# Patient Record
Sex: FEMALE | Race: WHITE
[De-identification: names, ages, dates, MRNs, and addresses within clinical notes are randomized per-mention and may not be internally consistent; named-entity substitution may affect disease eponyms.]

---

## 2017-10-29 ENCOUNTER — HEALTH MAINTENANCE LETTER (OUTPATIENT)
Age: 52
End: 2017-10-29

## 2018-02-09 RX ORDER — TRETINOIN 0.25 MG/G
CREAM TOPICAL AT BEDTIME
COMMUNITY

## 2018-02-15 ENCOUNTER — ANESTHESIA EVENT (OUTPATIENT)
Dept: SURGERY | Facility: CLINIC | Age: 53
End: 2018-02-15
Payer: COMMERCIAL

## 2018-02-15 ENCOUNTER — APPOINTMENT (OUTPATIENT)
Dept: GENERAL RADIOLOGY | Facility: CLINIC | Age: 53
End: 2018-02-15
Attending: ORTHOPAEDIC SURGERY
Payer: COMMERCIAL

## 2018-02-15 ENCOUNTER — HOSPITAL ENCOUNTER (OUTPATIENT)
Facility: CLINIC | Age: 53
Discharge: HOME | End: 2018-02-15
Attending: ORTHOPAEDIC SURGERY | Admitting: ORTHOPAEDIC SURGERY
Payer: COMMERCIAL

## 2018-02-15 ENCOUNTER — ANESTHESIA (OUTPATIENT)
Dept: SURGERY | Facility: CLINIC | Age: 53
End: 2018-02-15
Payer: COMMERCIAL

## 2018-02-15 VITALS
TEMPERATURE: 98.1 F | BODY MASS INDEX: 25.04 KG/M2 | RESPIRATION RATE: 14 BRPM | HEIGHT: 68 IN | DIASTOLIC BLOOD PRESSURE: 42 MMHG | OXYGEN SATURATION: 95 % | WEIGHT: 165.2 LBS | SYSTOLIC BLOOD PRESSURE: 115 MMHG

## 2018-02-15 DIAGNOSIS — M21.621 TAILOR'S BUNION OF RIGHT FOOT: Primary | ICD-10-CM

## 2018-02-15 PROCEDURE — 40000171 ZZH STATISTIC PRE-PROCEDURE ASSESSMENT III: Performed by: ORTHOPAEDIC SURGERY

## 2018-02-15 PROCEDURE — 37000009 ZZH ANESTHESIA TECHNICAL FEE, EACH ADDTL 15 MIN: Performed by: ORTHOPAEDIC SURGERY

## 2018-02-15 PROCEDURE — 25000128 H RX IP 250 OP 636: Performed by: ANESTHESIOLOGY

## 2018-02-15 PROCEDURE — 27210794 ZZH OR GENERAL SUPPLY STERILE: Performed by: ORTHOPAEDIC SURGERY

## 2018-02-15 PROCEDURE — 25000128 H RX IP 250 OP 636: Performed by: NURSE ANESTHETIST, CERTIFIED REGISTERED

## 2018-02-15 PROCEDURE — 25000566 ZZH SEVOFLURANE, EA 15 MIN: Performed by: ORTHOPAEDIC SURGERY

## 2018-02-15 PROCEDURE — 71000027 ZZH RECOVERY PHASE 2 EACH 15 MINS: Performed by: ORTHOPAEDIC SURGERY

## 2018-02-15 PROCEDURE — 25000128 H RX IP 250 OP 636: Performed by: ORTHOPAEDIC SURGERY

## 2018-02-15 PROCEDURE — 25000125 ZZHC RX 250: Performed by: ANESTHESIOLOGY

## 2018-02-15 PROCEDURE — C1713 ANCHOR/SCREW BN/BN,TIS/BN: HCPCS | Performed by: ORTHOPAEDIC SURGERY

## 2018-02-15 PROCEDURE — 36000058 ZZH SURGERY LEVEL 3 EA 15 ADDTL MIN: Performed by: ORTHOPAEDIC SURGERY

## 2018-02-15 PROCEDURE — 93010 ELECTROCARDIOGRAM REPORT: CPT | Performed by: INTERNAL MEDICINE

## 2018-02-15 PROCEDURE — 27210995 ZZH RX 272: Performed by: ORTHOPAEDIC SURGERY

## 2018-02-15 PROCEDURE — 25000125 ZZHC RX 250: Performed by: NURSE ANESTHETIST, CERTIFIED REGISTERED

## 2018-02-15 PROCEDURE — 36000060 ZZH SURGERY LEVEL 3 W FLUORO 1ST 30 MIN: Performed by: ORTHOPAEDIC SURGERY

## 2018-02-15 PROCEDURE — 71000012 ZZH RECOVERY PHASE 1 LEVEL 1 FIRST HR: Performed by: ORTHOPAEDIC SURGERY

## 2018-02-15 PROCEDURE — 25000132 ZZH RX MED GY IP 250 OP 250 PS 637: Performed by: ORTHOPAEDIC SURGERY

## 2018-02-15 PROCEDURE — 40000278 XR SURGERY CARM FLUORO LESS THAN 5 MIN: Mod: TC

## 2018-02-15 PROCEDURE — 37000008 ZZH ANESTHESIA TECHNICAL FEE, 1ST 30 MIN: Performed by: ORTHOPAEDIC SURGERY

## 2018-02-15 PROCEDURE — 71000013 ZZH RECOVERY PHASE 1 LEVEL 1 EA ADDTL HR: Performed by: ORTHOPAEDIC SURGERY

## 2018-02-15 PROCEDURE — 27211024 ZZHC OR SUPPLY OTHER OPNP: Performed by: ORTHOPAEDIC SURGERY

## 2018-02-15 DEVICE — IMPLANTABLE DEVICE: Type: IMPLANTABLE DEVICE | Site: ANKLE | Status: FUNCTIONAL

## 2018-02-15 RX ORDER — ROPIVACAINE HYDROCHLORIDE 7.5 MG/ML
INJECTION, SOLUTION EPIDURAL; PERINEURAL PRN
Status: DISCONTINUED | OUTPATIENT
Start: 2018-02-15 | End: 2018-02-15

## 2018-02-15 RX ORDER — PROPOFOL 10 MG/ML
INJECTION, EMULSION INTRAVENOUS PRN
Status: DISCONTINUED | OUTPATIENT
Start: 2018-02-15 | End: 2018-02-15

## 2018-02-15 RX ORDER — CEFAZOLIN SODIUM 2 G/100ML
2 INJECTION, SOLUTION INTRAVENOUS
Status: COMPLETED | OUTPATIENT
Start: 2018-02-15 | End: 2018-02-15

## 2018-02-15 RX ORDER — ACETAMINOPHEN 325 MG/1
650 TABLET ORAL EVERY 4 HOURS PRN
Qty: 100 TABLET | Refills: 0 | Status: SHIPPED | OUTPATIENT
Start: 2018-02-15

## 2018-02-15 RX ORDER — PROMETHAZINE HYDROCHLORIDE 25 MG/ML
25 INJECTION INTRAMUSCULAR; INTRAVENOUS ONCE
Status: COMPLETED | OUTPATIENT
Start: 2018-02-15 | End: 2018-02-15

## 2018-02-15 RX ORDER — EPHEDRINE SULFATE 50 MG/ML
INJECTION, SOLUTION INTRAMUSCULAR; INTRAVENOUS; SUBCUTANEOUS PRN
Status: DISCONTINUED | OUTPATIENT
Start: 2018-02-15 | End: 2018-02-15

## 2018-02-15 RX ORDER — ONDANSETRON 4 MG/1
4 TABLET, ORALLY DISINTEGRATING ORAL EVERY 30 MIN PRN
Status: DISCONTINUED | OUTPATIENT
Start: 2018-02-15 | End: 2018-02-15 | Stop reason: HOSPADM

## 2018-02-15 RX ORDER — DEXAMETHASONE SODIUM PHOSPHATE 4 MG/ML
INJECTION, SOLUTION INTRA-ARTICULAR; INTRALESIONAL; INTRAMUSCULAR; INTRAVENOUS; SOFT TISSUE PRN
Status: DISCONTINUED | OUTPATIENT
Start: 2018-02-15 | End: 2018-02-15

## 2018-02-15 RX ORDER — OXYCODONE HYDROCHLORIDE 5 MG/1
5-15 TABLET ORAL
Qty: 40 TABLET | Refills: 0 | Status: SHIPPED | OUTPATIENT
Start: 2018-02-15

## 2018-02-15 RX ORDER — ONDANSETRON 4 MG/1
4-8 TABLET, ORALLY DISINTEGRATING ORAL EVERY 8 HOURS PRN
Qty: 4 TABLET | Refills: 0 | Status: SHIPPED | OUTPATIENT
Start: 2018-02-15

## 2018-02-15 RX ORDER — FENTANYL CITRATE 50 UG/ML
25-50 INJECTION, SOLUTION INTRAMUSCULAR; INTRAVENOUS
Status: DISCONTINUED | OUTPATIENT
Start: 2018-02-15 | End: 2018-02-15 | Stop reason: HOSPADM

## 2018-02-15 RX ORDER — LIDOCAINE HCL/EPINEPHRINE/PF 2%-1:200K
VIAL (ML) INJECTION PRN
Status: DISCONTINUED | OUTPATIENT
Start: 2018-02-15 | End: 2018-02-15

## 2018-02-15 RX ORDER — MAGNESIUM HYDROXIDE 1200 MG/15ML
LIQUID ORAL PRN
Status: DISCONTINUED | OUTPATIENT
Start: 2018-02-15 | End: 2018-02-15 | Stop reason: HOSPADM

## 2018-02-15 RX ORDER — HYDROMORPHONE HYDROCHLORIDE 1 MG/ML
.3-.5 INJECTION, SOLUTION INTRAMUSCULAR; INTRAVENOUS; SUBCUTANEOUS EVERY 10 MIN PRN
Status: DISCONTINUED | OUTPATIENT
Start: 2018-02-15 | End: 2018-02-15 | Stop reason: HOSPADM

## 2018-02-15 RX ORDER — EPHEDRINE SULFATE 50 MG/ML
25 INJECTION, SOLUTION INTRAVENOUS ONCE
Status: COMPLETED | OUTPATIENT
Start: 2018-02-15 | End: 2018-02-15

## 2018-02-15 RX ORDER — OXYCODONE HYDROCHLORIDE 5 MG/1
5-15 TABLET ORAL
Status: COMPLETED | OUTPATIENT
Start: 2018-02-15 | End: 2018-02-15

## 2018-02-15 RX ORDER — FENTANYL CITRATE 50 UG/ML
INJECTION, SOLUTION INTRAMUSCULAR; INTRAVENOUS PRN
Status: DISCONTINUED | OUTPATIENT
Start: 2018-02-15 | End: 2018-02-15

## 2018-02-15 RX ORDER — SODIUM CHLORIDE, SODIUM LACTATE, POTASSIUM CHLORIDE, CALCIUM CHLORIDE 600; 310; 30; 20 MG/100ML; MG/100ML; MG/100ML; MG/100ML
INJECTION, SOLUTION INTRAVENOUS CONTINUOUS
Status: DISCONTINUED | OUTPATIENT
Start: 2018-02-15 | End: 2018-02-15 | Stop reason: HOSPADM

## 2018-02-15 RX ORDER — CEFAZOLIN SODIUM 1 G/3ML
1 INJECTION, POWDER, FOR SOLUTION INTRAMUSCULAR; INTRAVENOUS SEE ADMIN INSTRUCTIONS
Status: DISCONTINUED | OUTPATIENT
Start: 2018-02-15 | End: 2018-02-15 | Stop reason: HOSPADM

## 2018-02-15 RX ORDER — LIDOCAINE 40 MG/G
CREAM TOPICAL
Status: DISCONTINUED | OUTPATIENT
Start: 2018-02-15 | End: 2018-02-15 | Stop reason: HOSPADM

## 2018-02-15 RX ORDER — MEPERIDINE HYDROCHLORIDE 25 MG/ML
12.5 INJECTION INTRAMUSCULAR; INTRAVENOUS; SUBCUTANEOUS
Status: DISCONTINUED | OUTPATIENT
Start: 2018-02-15 | End: 2018-02-15 | Stop reason: HOSPADM

## 2018-02-15 RX ORDER — NALOXONE HYDROCHLORIDE 0.4 MG/ML
.1-.4 INJECTION, SOLUTION INTRAMUSCULAR; INTRAVENOUS; SUBCUTANEOUS
Status: DISCONTINUED | OUTPATIENT
Start: 2018-02-15 | End: 2018-02-15 | Stop reason: HOSPADM

## 2018-02-15 RX ORDER — ONDANSETRON 2 MG/ML
4 INJECTION INTRAMUSCULAR; INTRAVENOUS EVERY 30 MIN PRN
Status: DISCONTINUED | OUTPATIENT
Start: 2018-02-15 | End: 2018-02-15 | Stop reason: HOSPADM

## 2018-02-15 RX ORDER — KETOROLAC TROMETHAMINE 10 MG/1
10 TABLET, FILM COATED ORAL EVERY 6 HOURS PRN
Qty: 20 TABLET | Refills: 0 | Status: SHIPPED | OUTPATIENT
Start: 2018-02-15

## 2018-02-15 RX ORDER — LIDOCAINE HYDROCHLORIDE 10 MG/ML
INJECTION, SOLUTION INFILTRATION; PERINEURAL PRN
Status: DISCONTINUED | OUTPATIENT
Start: 2018-02-15 | End: 2018-02-15

## 2018-02-15 RX ORDER — ONDANSETRON 2 MG/ML
INJECTION INTRAMUSCULAR; INTRAVENOUS PRN
Status: DISCONTINUED | OUTPATIENT
Start: 2018-02-15 | End: 2018-02-15

## 2018-02-15 RX ORDER — MORPHINE SULFATE 15 MG/1
15 TABLET, FILM COATED, EXTENDED RELEASE ORAL EVERY 12 HOURS
Qty: 6 TABLET | Refills: 0 | Status: SHIPPED | OUTPATIENT
Start: 2018-02-15

## 2018-02-15 RX ADMIN — DEXAMETHASONE SODIUM PHOSPHATE 4 MG: 4 INJECTION, SOLUTION INTRA-ARTICULAR; INTRALESIONAL; INTRAMUSCULAR; INTRAVENOUS; SOFT TISSUE at 07:46

## 2018-02-15 RX ADMIN — OXYCODONE HYDROCHLORIDE 5 MG: 5 TABLET ORAL at 10:42

## 2018-02-15 RX ADMIN — ONDANSETRON 4 MG: 2 INJECTION INTRAMUSCULAR; INTRAVENOUS at 09:14

## 2018-02-15 RX ADMIN — MIDAZOLAM 3 MG: 1 INJECTION INTRAMUSCULAR; INTRAVENOUS at 07:00

## 2018-02-15 RX ADMIN — LIDOCAINE HYDROCHLORIDE,EPINEPHRINE BITARTRATE 9 ML: 20; .005 INJECTION, SOLUTION EPIDURAL; INFILTRATION; INTRACAUDAL; PERINEURAL at 07:05

## 2018-02-15 RX ADMIN — LIDOCAINE HYDROCHLORIDE,EPINEPHRINE BITARTRATE 6 ML: 20; .005 INJECTION, SOLUTION EPIDURAL; INFILTRATION; INTRACAUDAL; PERINEURAL at 07:26

## 2018-02-15 RX ADMIN — LIDOCAINE HYDROCHLORIDE 30 MG: 10 INJECTION, SOLUTION INFILTRATION; PERINEURAL at 07:46

## 2018-02-15 RX ADMIN — FENTANYL CITRATE 100 MCG: 50 INJECTION, SOLUTION INTRAMUSCULAR; INTRAVENOUS at 07:00

## 2018-02-15 RX ADMIN — PROPOFOL 150 MG: 10 INJECTION, EMULSION INTRAVENOUS at 07:46

## 2018-02-15 RX ADMIN — Medication 10 MG: at 08:27

## 2018-02-15 RX ADMIN — ROPIVACAINE HYDROCHLORIDE 15 ML: 7.5 INJECTION, SOLUTION EPIDURAL; PERINEURAL at 07:05

## 2018-02-15 RX ADMIN — ONDANSETRON 4 MG: 2 INJECTION INTRAMUSCULAR; INTRAVENOUS at 10:26

## 2018-02-15 RX ADMIN — FENTANYL CITRATE 100 MCG: 50 INJECTION, SOLUTION INTRAMUSCULAR; INTRAVENOUS at 07:46

## 2018-02-15 RX ADMIN — SODIUM CHLORIDE, POTASSIUM CHLORIDE, SODIUM LACTATE AND CALCIUM CHLORIDE: 600; 310; 30; 20 INJECTION, SOLUTION INTRAVENOUS at 07:46

## 2018-02-15 RX ADMIN — EPHEDRINE SULFATE 25 MG: 50 INJECTION INTRAMUSCULAR; INTRAVENOUS; SUBCUTANEOUS at 11:32

## 2018-02-15 RX ADMIN — MIDAZOLAM 2 MG: 1 INJECTION INTRAMUSCULAR; INTRAVENOUS at 07:39

## 2018-02-15 RX ADMIN — ROCURONIUM BROMIDE 30 MG: 10 INJECTION INTRAVENOUS at 07:46

## 2018-02-15 RX ADMIN — SODIUM CHLORIDE, POTASSIUM CHLORIDE, SODIUM LACTATE AND CALCIUM CHLORIDE: 600; 310; 30; 20 INJECTION, SOLUTION INTRAVENOUS at 06:28

## 2018-02-15 RX ADMIN — CEFAZOLIN SODIUM 2 G: 2 INJECTION, SOLUTION INTRAVENOUS at 07:40

## 2018-02-15 RX ADMIN — ROPIVACAINE HYDROCHLORIDE 10 ML: 7.5 INJECTION, SOLUTION EPIDURAL; PERINEURAL at 07:26

## 2018-02-15 RX ADMIN — PROMETHAZINE HYDROCHLORIDE 25 MG: 25 INJECTION INTRAMUSCULAR; INTRAVENOUS at 11:32

## 2018-02-15 ASSESSMENT — ENCOUNTER SYMPTOMS
SEIZURES: 0
DYSRHYTHMIAS: 0

## 2018-02-15 NOTE — IP AVS SNAPSHOT
MRN:[id number]                      After Visit Summary   2/15/2018    Celena Padron    MRN: [id number]           Thank you!     Thank you for choosing [facility] for your care. Our goal is always to provide you with excellent care. Hearing back from our patients is one way we can continue to improve our services. Please take a few minutes to complete the written survey that you may receive in the mail after you visit. If you would like to speak to someone directly about your visit please contact Patient Relations at [phone]. Thank you!          Patient Information     Date Of Birth          1965        About your hospital stay     You were admitted on:  February 15, 2018 You last received care in the:  [facility] PreOP/PostOP    You were discharged on:  February 15, 2018       Who to Call     For medical emergencies, please call 911.  For non-urgent questions about your medical care, please call your primary care provider or clinic, [phone]  For questions related to your surgery, please call your surgery clinic        Attending Provider     Provider Specialty    Nain Mir MD Orthopedics       Primary Care Provider Office Phone # Fax #    Jaimie Durham [phone]      After Care Instructions     Discharge Instructions       Review discharge instructions as directed by Provider.            Discharge Instructions       Patient to arrange for return to clinic appointment in two weeks.            Elevate affected extremity           No dressing change       until follow up clinic appointment.            Weight bearing status - Foot flat                 Further instructions from your care team       GENERAL ANESTHESIA OR SEDATION ADULT DISCHARGE INSTRUCTIONS   SPECIAL PRECAUTIONS FOR 24 HOURS AFTER SURGERY    IT IS NOT UNUSUAL TO FEEL LIGHT-HEADED OR FAINT, UP TO 24 HOURS AFTER SURGERY OR WHILE TAKING PAIN MEDICATION.  IF YOU HAVE  THESE SYMPTOMS; SIT FOR A FEW MINUTES BEFORE STANDING AND HAVE SOMEONE ASSIST YOU WHEN YOU GET UP TO WALK OR USE THE BATHROOM.    YOU SHOULD REST AND RELAX FOR THE NEXT 24 HOURS AND YOU MUST MAKE ARRANGEMENTS TO HAVE SOMEONE STAY WITH YOU FOR AT LEAST 24 HOURS AFTER YOUR DISCHARGE.  AVOID HAZARDOUS AND STRENUOUS ACTIVITIES.  DO NOT MAKE IMPORTANT DECISIONS FOR 24 HOURS.    DO NOT DRIVE ANY VEHICLE OR OPERATE MECHANICAL EQUIPMENT FOR 24 HOURS FOLLOWING THE END OF YOUR SURGERY.  EVEN THOUGH YOU MAY FEEL NORMAL, YOUR REACTIONS MAY BE AFFECTED BY THE MEDICATION YOU HAVE RECEIVED.    DO NOT DRINK ALCOHOLIC BEVERAGES FOR 24 HOURS FOLLOWING YOUR SURGERY.    DRINK CLEAR LIQUIDS (APPLE JUICE, GINGER ALE, 7-UP, BROTH, ETC.).  PROGRESS TO YOUR REGULAR DIET AS YOU FEEL ABLE.    YOU MAY HAVE A DRY MOUTH, A SORE THROAT, MUSCLES ACHES OR TROUBLE SLEEPING.  THESE SHOULD GO AWAY AFTER 24 HOURS.    CALL YOUR DOCTOR FOR ANY OF THE FOLLOWING:  SIGNS OF INFECTION (FEVER, GROWING TENDERNESS AT THE SURGERY SITE, A LARGE AMOUNT OF DRAINAGE OR BLEEDING, SEVERE PAIN, FOUL-SMELLING DRAINAGE, REDNESS OR SWELLING.    IT HAS BEEN OVER 8 TO 10 HOURS SINCE SURGERY AND YOU ARE STILL NOT ABLE TO URINATE (PASS WATER).     Post Operative Instructions for DR. ROSEMARY BECKHAM M.D.  [facility]    DIET and NUTRITIONAL SUPPLEMENTATION:  1. Begin with liquids and light foods (jello, soups, etc).  Rehydration is important the first few days after surgery.  Gradually resume a normal diet; avoiding foods with a label.   2. To aid in both surgical wound and bone healing here are several recommendations:  ?    Eat 3 or more servings per day of foods high in Protein and Calories.              ? Examples include: fish, poultry, meat, pork, nuts, and dairy products  ?    Take Calcium 1500 mg per day from a combination of dairy products and Calcium supplements  ?    Take Vitamin C 500 mg 3 times per day (total of 1500 mg) or eat 5 servings of citrus  fruits   ?    Take Zinc 50 mg 2 times per day (total of 100 mg)  ?    Take Betacarotene 25,000 IU each day  3. It is best not to smoke, use tobacco, or nicotine patches during your healing period.    FOR 24 HOURS FOLLOWING SURGERY:  1. Be in the care of a responsible adult.  2. Do not drive or operate any machinery.  3. Do not make important personal or business decisions or sign legal documents.  4. Avoid alcoholic beverages.    MEDICATIONS:  1. Strong oral pain medication has been prescribed for the first few days. Use only as directed.  2. Do not drink alcoholic beverages while using this medication.  3. When taking pain medication, be careful as you walk or climb stairs. Mild dizziness is not unusual.  4. Continue to take your routine medications as prescribed by your internist/family practitioner. Please ask if you are unsure about continuing these medications after surgery.  5. Do not take any medications that have not been prescribed by your physician.    ACTIVITIES:  1. While seated or lying, keep the operated limb at the level of your heart, toes at the level of your nose, for 23 hours per day for three full days after surgery.  This is the time when the most swelling occurs while the wound attempts to seal.   2. Over the next 10 days if the limb begins to throb, you may have been up for too long or been trying to do too much. Take a break and elevate your leg.   3. Place ice packs over the operative site on the cast or dressing.  Use for 20 minutes every 2-3 hours while you are awake-it will help alleviate pain and swelling.  4. Return to work:  timing depends on your type of employment.    WOUND CARE:   1. Unless you have been instructed by Dr. Mir, do not remove your cast or dressings.  2. To help keep your dressing clean and dry use a waterproof cast sock (Dry-pro)                  [email or web address] or a plastic bag and duct tape for bathing.  3. Do not place foreign objects into your cast or  dressing even if you are itchy-they can get stuck and cause ulcers and infections.  Use a hairdryer if skin becomes moist from sweat.                WHEN TO CALL YOUR PHYSICIAN:  1. While a low-grade fever in the first 24-48 post-operative hours is not unusual, a temperature above 101.50F, an elevation in temperature more than 3 days after surgery, or associated with chills, merits evaluation.   2. Increasing or a change in type of pain, swelling, numbness, pain with constant toe stretching.  3. Continuous drainage or bleeding from the incision site. A small amount is expected.  4. Any other worrisome condition.    FOLLOW-UP CARE:  If you have not scheduled a follow-up appointment, please call my  at [phone] or email her at   [email or web address] or through our patient portal.  To improve your overall experience routine care dressing or cast changes may be scheduled with an orthopedic assistant.      Follow up appointment_______________________________________________________________________    Additional information: IF taking pain medications  You may have been prescribed a multi-modal pain medication regimen.  Inform your physician of any drug allergies.  Longer-Acting Narcotic (example MS contin)                 Directions:  take 1 tablet by mouth every 12 hours only if needed for pain.  Long-Acting Anti-Inflammatory (example ibuprofen, motrin)                 Directions:  take 1 tablet by mouth every 8 hours for the next five days and then only if needed for pain.  Short-Acting Narcotic (example oxycodone immediate release, dilaudid)                 Directions:  take prescribed amount by mouth every 3 hours as needed for breakthrough pain.    What is breakthrough pain?  Breakthrough pain is pain that is NOT controlled by the longer-acting pain pill.    Directions for Use:  1. Start taking the longer-acting pain medication at bedtime on the day of your surgery; regardless of if you   have pain.   Since this medication is taken every 12 hours, you don t want to wait until the middle of the night to start it, or you will continue to have to take it in the middle of the night to stay on schedule.      2. DO NOT CRUSH, CHEW OR DISSOLVE THE TABLET.    3. If you have uncontrolled pain (breakthrough) during the 12 hours, take the short acting pain pill as prescribed.    4. Some patients need to take both the longer acting and short acting pain pills for the first few days to control their pain.    5. ALWAYS take with food (soup, jello, fruit or nuts).    6. Pain pills can cause constipation.  Start taking the Miralax one packet or capful daily the morning after surgery.  Continue until you stop using narcotics.    7. If the pain pill causes nausea, take the medication for nausea (if prescribed).  Wait for one hour after taking the anti-nausea medication before you resume your pain pills.        8. If you have itching, take over-the-counter diphenhydramine (Benadryl) as directed on the label.  If you have a rash or hives, do not resume your pain medication until an allergic reaction is ruled out.  Call physician for further instructions.    9. STOP TAKING THE MEDICATIONS AND CALL YOUR DOCTOR IF:       ?  You have uncontrolled nausea and or vomiting     ?  You have a rash or hives     ?   IF YOU HAVE TROUBLE BREATHING OR SWALLOWING, CALL [phone]       Caring For Your Cast     This information was prepared for you to help you take care of your cast. Please read each section carefully and ask your physician if you have questions.    HOW TO CARE FOR YOUR CAST    If your cast is made of plaster of Consuelo and it becomes soiled, clean using a damp cloth with dry cleanser or use white shoe polish sparingly. If your cast is made of fiberglass, clean with a damp cloth with a small amount of mild soap.    Avoid bumping or knocking the cast against any hard object. Cover rough areas with tape.    Never trim or cut down the length of  your cast. Please call your physician if the cast becomes loose, broken or cracked.    If skin under the cast begins to itch, do not use anything to scratch under the cast since it may break the skin and cause an infection. Parents should be alert to prevent children from sticking forks, sticks or other objects inside the cast.    If your physician orders a cast boot, be sure to wear it.    You may wash areas around the cast, but do not saturate the cast in the process. Some coverings can be used to protect your cast. Please ask you physician to recommend one.  WHAT TO CHECK FOR    Check your fingers or toes for color changes, swelling, loss of motion, numbness, tingling or severe pain. In infants or young children, check for crying that cannot be soothed by usual methods. Call your physician if these symptoms occur.    If swelling is noted during the first 24 to 48 hours, elevate the extremity higher than your head. After this time, elevate it whenever you are in bed.    Check cast for undesirable odors or drainage. Also check for any areas of local pain under your cast. These may be signs of an area of pressure under the cast.    Be sure any padding used is well anchored to the cast. Be careful not to pull padding out. Without the padding. Loose material may slip into cast and cause irritation.    WHEN TO CALL YOUR PHYSICIAN  You should call IF you notice:    A bluish color, increased swelling, loss of motion, increased numbness/tingling or severe pain of fingers or toes.    Unusually foul odor from cast.    Unusual drainage (blood is expected if there has been surgery).    Broken, cracked or very loose cast.    Localized pain under cast.  CAST REMOVAL AND POST CAST CARE    A specially designed saw will be used to remove your cast. Cast removal is fast and painless.    Use of skin lotion or bathing with bath oil may eliminate some of the dry, flaky skin which is present after your cast is removed. Do not scrub  "your skin since this may cause skin breakdown.     Elevate your extremity if you notice any swelling after your cast is removed.    Your arm or leg may appear thinner and lighter because you haven t been using it. Your physician will determine how much physical activity is advisable following removal of your cast.    REMEMBER: Cast care must continue as long as your cast is on.            Pending Results     Date and Time Order Name Status Description    2/15/2018 0627 EKG 12-lead, tracing only Preliminary             Admission Information     Date & Time Provider Department Dept. Phone    2/15/2018 Nain Mir MD [facility] PreOP/PostOP [phone]      Your Vitals Were     Blood Pressure Temperature Respirations Height Weight Pulse Oximetry    107/70 98.1  F (36.7  C) (Temporal) 12 1.727 m (5' 8\") 74.9 kg (165 lb 3.2 oz) 97%    BMI (Body Mass Index)                   25.12 kg/m2           MyChart Information     [employer] lets you send messages to your doctor, view your test results, renew your prescriptions, schedule appointments and more. To sign up, go to [email or web address] . Click on \"Log in\" on the left side of the screen, which will take you to the Welcome page. Then click on \"Sign up Now\" on the right side of the page.     You will be asked to enter the access code listed below, as well as some personal information. Please follow the directions to create your username and password.     Your access code is: [email or web address]  Expires: 2018 10:11 AM     Your access code will  in 90 days. If you need help or a new code, please call your [place] clinic or [phone].        Care EveryWhere ID     This is your Care EveryWhere ID. This could be used by other organizations to access your [place] medical records  [id number]        Equal Access to Services     SHAINA GOLDSMITH AH: Arianna Lopez, pablo orellana, qaybta kastacie manzanares, ravi rebolledo " lamonika Kilpatrick [facility] [phone].    ATENCIÓN: Si habla carlos, tiene a wilkins disposición servicios gratuitos de asistencia lingüística. Amber guevara [phone].    We comply with applicable federal civil rights laws and [place] laws. We do not discriminate on the basis of race, color, national origin, age, disability, sex, sexual orientation, or gender identity.               Review of your medicines      START taking        Dose / Directions    acetaminophen 325 MG tablet   Commonly known as:  TYLENOL   Used for:  Tailor's bunion of right foot        Dose:  650 mg   Take 2 tablets (650 mg) by mouth every 4 hours as needed for other (mild pain)   Quantity:  100 tablet   Refills:  0       ketorolac 10 MG tablet   Commonly known as:  TORADOL   Used for:  Tailor's bunion of right foot        Dose:  10 mg   Take 1 tablet (10 mg) by mouth every 6 hours as needed   Quantity:  20 tablet   Refills:  0       morphine 15 MG 12 hr tablet   Commonly known as:  MS CONTIN   Used for:  Tailor's bunion of right foot        Dose:  15 mg   Take 1 tablet (15 mg) by mouth every 12 hours maximum 2 tablet(s) per day   Quantity:  6 tablet   Refills:  0       ondansetron 4 MG ODT tab   Commonly known as:  ZOFRAN-ODT   Used for:  Tailor's bunion of right foot        Dose:  4-8 mg   Take 1-2 tablets (4-8 mg) by mouth every 8 hours as needed for nausea Dissolve ON the tongue.   Quantity:  4 tablet   Refills:  0       oxyCODONE IR 5 MG tablet   Commonly known as:  ROXICODONE   Used for:  Tailor's bunion of right foot        Dose:  5-15 mg   Take 1-3 tablets (5-15 mg) by mouth every 3 hours as needed for pain or other (Moderate to Severe)   Quantity:  40 tablet   Refills:  0         CONTINUE these medicines which have NOT CHANGED        Dose / Directions    AMBIEN PO        Dose:  5 mg   Take 5 mg by mouth nightly as needed for sleep   Refills:  0       ATORVASTATIN CALCIUM PO        Take by mouth daily   Refills:  0       MULTI-VITAMIN PO         Take  by mouth.   Refills:  0       TRAZODONE HCL PO        Take by mouth daily as needed for sleep   Refills:  0       tretinoin 0.025 % cream   Commonly known as:  RETIN-A        Apply topically At Bedtime   Refills:  0            Where to get your medicines      These medications were sent to [place], [place] - [address] [address]  [address] [facility] [address]     Phone:  [phone]     acetaminophen 325 MG tablet    ketorolac 10 MG tablet    ondansetron 4 MG ODT tab         Some of these will need a paper prescription and others can be bought over the counter. Ask your nurse if you have questions.     Bring a paper prescription for each of these medications     morphine 15 MG 12 hr tablet    oxyCODONE IR 5 MG tablet                Protect others around you: Learn how to safely use, store and throw away your medicines at www.disposemymeds.org.        Information about OPIOIDS     PRESCRIPTION OPIOIDS: WHAT YOU NEED TO KNOW    Prescription opioids can be used to help relieve moderate to severe pain and are often prescribed following a surgery or injury, or for certain health conditions. These medications can be an important part of treatment but also come with serious risks. It is important to work with your health care provider to make sure you are getting the safest, most effective care.    WHAT ARE THE RISKS AND SIDE EFFECTS OF OPIOID USE?  Prescription opioids carry serious risks of addiction and overdose, especially with prolonged use. An opioid overdose, often marked by slowed breathing can cause sudden death. The use of prescription opioids can have a number of side effects as well, even when taken as directed:      Tolerance - meaning you might need to take more of a medication for the same pain relief    Physical dependence - meaning you have symptoms of withdrawal when a medication is stopped    Increased sensitivity to pain    Constipation    Nausea,  vomiting, and dry mouth    Sleepiness and dizziness    Confusion    Depression    Low levels of testosterone that can result in lower sex drive, energy, and strength    Itching and sweating    RISKS ARE GREATER WITH:    History of drug misuse, substance use disorder, or overdose    Mental health conditions (such as depression or anxiety)    Sleep apnea    Older age (65 years or older)    Pregnancy    Avoid alcohol while taking prescription opioids.   Also, unless specifically advised by your health care provider, medications to avoid include:    Benzodiazepines (such as Xanax or Valium)    Muscle relaxants (such as Soma or Flexeril)    Hypnotics (such as Ambien or Lunesta)    Other prescription opioids    KNOW YOUR OPTIONS:  Talk to your health care provider about ways to manage your pain that do not involve prescription opioids. Some of these options may actually work better and have fewer risks and side effects:    Pain relievers such as acetaminophen, ibuprofen, and naproxen    Some medications that are also used for depression or seizures    Physical therapy and exercise    Cognitive behavioral therapy, a psychological, goal-directed approach, in which patients learn how to modify physical, behavioral, and emotional triggers of pain and stress    IF YOU ARE PRESCRIBED OPIOIDS FOR PAIN:    Never take opioids in greater amounts or more often than prescribed    Follow up with your primary health care provider and work together to create a plan on how to manage your pain.    Talk about ways to help manage your pain that do not involve prescription opioids    Talk about all concerns and side effects    Help prevent misuse and abuse    Never sell or share prescription opioids    Never use another person's prescription opioids    Store prescription opioids in a secure place and out of reach of others (this may include visitors, children, friends, and family)    Visit www.cdc.gov/drugoverdose to learn about risks of  opioid abuse and overdose    If you believe you may be struggling with addiction, tell your health care provider and ask for guidance or call [facility]'s National Helpline at 6-282-091-HELP    LEARN MORE / www.cdc.gov/drugoverdose/prescribing/guideline.html    Safely dispose of unused prescription opioids: Find your local drug take-back programs and more information about the importance of safe disposal at www.doseofreality.[place].gov             Medication List: This is a list of all your medications and when to take them. Check marks below indicate your daily home schedule. Keep this list as a reference.      Medications           Morning Afternoon Evening Bedtime As Needed    acetaminophen 325 MG tablet   Commonly known as:  TYLENOL   Take 2 tablets (650 mg) by mouth every 4 hours as needed for other (mild pain)                                AMBIEN PO   Take 5 mg by mouth nightly as needed for sleep                                ATORVASTATIN CALCIUM PO   Take by mouth daily                                ketorolac 10 MG tablet   Commonly known as:  TORADOL   Take 1 tablet (10 mg) by mouth every 6 hours as needed                                morphine 15 MG 12 hr tablet   Commonly known as:  MS CONTIN   Take 1 tablet (15 mg) by mouth every 12 hours maximum 2 tablet(s) per day                                MULTI-VITAMIN PO   Take  by mouth.                                ondansetron 4 MG ODT tab   Commonly known as:  ZOFRAN-ODT   Take 1-2 tablets (4-8 mg) by mouth every 8 hours as needed for nausea Dissolve ON the tongue.                                oxyCODONE IR 5 MG tablet   Commonly known as:  ROXICODONE   Take 1-3 tablets (5-15 mg) by mouth every 3 hours as needed for pain or other (Moderate to Severe)   Last time this was given:  5 mg on 2/15/2018 10:42 AM                                TRAZODONE HCL PO   Take by mouth daily as needed for sleep                                tretinoin 0.025 % cream   Commonly  known as:  RETIN-A   Apply topically At Bedtime

## 2018-02-15 NOTE — ANESTHESIA PROCEDURE NOTES
Peripheral nerve/Neuraxial procedure note : Femoral (Adductor Canal)  Pre-Procedure  Performed by ANTONIO JAMISON  Referred by [facility]  Location: pre-op      Pre-Anesthestic Checklist: patient identified, IV checked, site marked, risks and benefits discussed, informed consent, monitors and equipment checked, pre-op evaluation, at physician/surgeon's request and post-op pain management    Timeout  Correct Patient: Yes   Correct Procedure: Yes   Correct Site: Yes   Correct Laterality: Yes   Correct Position: Yes   Site Marked: Yes   .   Procedure Documentation    .    Procedure:  right  Femoral (Adductor Canal).     Ultrasound used to identify targeted nerve, plexus, or vascular marker and placed a needle adjacent to it., Ultrasound was used to visualize the spread of the anesthetic in close proximity to the above stated nerve.   Patient Prep;mask, sterile gloves, povidone-iodine 7.5% surgical scrub.  .  Needle: insulated, short bevel Needle Gauge: 20.    Needle Length (Inches) 4  Insertion Method: Single Shot.       Assessment/Narrative  Paresthesias: No.  Injection made incrementally with aspirations every 5 mL..  The placement was negative for: blood aspirated, painful injection and site bleeding.  Bolus given via needle..   Secured via.   Complications:. Test dose of mL at. Test dose negative for signs of intravascular, subdural or intrathecal injection. Comments:  The surgeon has given a verbal order transferring care of this patient to me for the performance of a regional analgesia block for post-op pain control. It is requested of me because I am uniquely trained and qualified to perform this block and the surgeon is neither trained nor qualified to perform this procedure.

## 2018-02-15 NOTE — BRIEF OP NOTE
[facility] Brief Operative Note    Pre-operative diagnosis: Sprain of tarsal ligament right foot and bunion of right foot   Post-operative diagnosis * No post-op diagnosis entered *  Kaleb, cci, pee, pb irrep tear   Procedure: Procedure(s):  Right ankle and hindfoot ligament reconstruction, peroneal tendon repair and transfer, bunionette correction  Surgeon request general with popliteal and adductor canal anesthesia - Wound Class: I-Clean   - Wound Class: I-Clean   Surgeon(s): Surgeon(s) and Role:     * Nain Mir MD - Primary   Estimated blood loss: * No values recorded between 2/15/2018  8:01 AM and 2/15/2018  9:36 AM *    Specimens: * No specimens in log *   Findings: pb tear

## 2018-02-15 NOTE — DISCHARGE INSTRUCTIONS
GENERAL ANESTHESIA OR SEDATION ADULT DISCHARGE INSTRUCTIONS   SPECIAL PRECAUTIONS FOR 24 HOURS AFTER SURGERY    IT IS NOT UNUSUAL TO FEEL LIGHT-HEADED OR FAINT, UP TO 24 HOURS AFTER SURGERY OR WHILE TAKING PAIN MEDICATION.  IF YOU HAVE THESE SYMPTOMS; SIT FOR A FEW MINUTES BEFORE STANDING AND HAVE SOMEONE ASSIST YOU WHEN YOU GET UP TO WALK OR USE THE BATHROOM.    YOU SHOULD REST AND RELAX FOR THE NEXT 24 HOURS AND YOU MUST MAKE ARRANGEMENTS TO HAVE SOMEONE STAY WITH YOU FOR AT LEAST 24 HOURS AFTER YOUR DISCHARGE.  AVOID HAZARDOUS AND STRENUOUS ACTIVITIES.  DO NOT MAKE IMPORTANT DECISIONS FOR 24 HOURS.    DO NOT DRIVE ANY VEHICLE OR OPERATE MECHANICAL EQUIPMENT FOR 24 HOURS FOLLOWING THE END OF YOUR SURGERY.  EVEN THOUGH YOU MAY FEEL NORMAL, YOUR REACTIONS MAY BE AFFECTED BY THE MEDICATION YOU HAVE RECEIVED.    DO NOT DRINK ALCOHOLIC BEVERAGES FOR 24 HOURS FOLLOWING YOUR SURGERY.    DRINK CLEAR LIQUIDS (APPLE JUICE, GINGER ALE, 7-UP, BROTH, ETC.).  PROGRESS TO YOUR REGULAR DIET AS YOU FEEL ABLE.    YOU MAY HAVE A DRY MOUTH, A SORE THROAT, MUSCLES ACHES OR TROUBLE SLEEPING.  THESE SHOULD GO AWAY AFTER 24 HOURS.    CALL YOUR DOCTOR FOR ANY OF THE FOLLOWING:  SIGNS OF INFECTION (FEVER, GROWING TENDERNESS AT THE SURGERY SITE, A LARGE AMOUNT OF DRAINAGE OR BLEEDING, SEVERE PAIN, FOUL-SMELLING DRAINAGE, REDNESS OR SWELLING.    IT HAS BEEN OVER 8 TO 10 HOURS SINCE SURGERY AND YOU ARE STILL NOT ABLE TO URINATE (PASS WATER).     Post Operative Instructions for DR. ROSEMARY BECKHAM M.D.  [facility]    DIET and NUTRITIONAL SUPPLEMENTATION:  1. Begin with liquids and light foods (jello, soups, etc).  Rehydration is important the first few days after surgery.  Gradually resume a normal diet; avoiding foods with a label.   2. To aid in both surgical wound and bone healing here are several recommendations:  ?    Eat 3 or more servings per day of foods high in Protein and Calories.              ? Examples include: fish,  poultry, meat, pork, nuts, and dairy products  ?    Take Calcium 1500 mg per day from a combination of dairy products and Calcium supplements  ?    Take Vitamin C 500 mg 3 times per day (total of 1500 mg) or eat 5 servings of citrus fruits   ?    Take Zinc 50 mg 2 times per day (total of 100 mg)  ?    Take Betacarotene 25,000 IU each day  3. It is best not to smoke, use tobacco, or nicotine patches during your healing period.    FOR 24 HOURS FOLLOWING SURGERY:  1. Be in the care of a responsible adult.  2. Do not drive or operate any machinery.  3. Do not make important personal or business decisions or sign legal documents.  4. Avoid alcoholic beverages.    MEDICATIONS:  1. Strong oral pain medication has been prescribed for the first few days. Use only as directed.  2. Do not drink alcoholic beverages while using this medication.  3. When taking pain medication, be careful as you walk or climb stairs. Mild dizziness is not unusual.  4. Continue to take your routine medications as prescribed by your internist/family practitioner. Please ask if you are unsure about continuing these medications after surgery.  5. Do not take any medications that have not been prescribed by your physician.    ACTIVITIES:  1. While seated or lying, keep the operated limb at the level of your heart, toes at the level of your nose, for 23 hours per day for three full days after surgery.  This is the time when the most swelling occurs while the wound attempts to seal.   2. Over the next 10 days if the limb begins to throb, you may have been up for too long or been trying to do too much. Take a break and elevate your leg.   3. Place ice packs over the operative site on the cast or dressing.  Use for 20 minutes every 2-3 hours while you are awake-it will help alleviate pain and swelling.  4. Return to work:  timing depends on your type of employment.    WOUND CARE:   1. Unless you have been instructed by Dr. Mir, do not remove your  cast or dressings.  2. To help keep your dressing clean and dry use a waterproof cast sock (Dry-pro)                  [email or web address] or a plastic bag and duct tape for bathing.  3. Do not place foreign objects into your cast or dressing even if you are itchy-they can get stuck and cause ulcers and infections.  Use a hairdryer if skin becomes moist from sweat.                WHEN TO CALL YOUR PHYSICIAN:  1. While a low-grade fever in the first 24-48 post-operative hours is not unusual, a temperature above 101.50F, an elevation in temperature more than 3 days after surgery, or associated with chills, merits evaluation.   2. Increasing or a change in type of pain, swelling, numbness, pain with constant toe stretching.  3. Continuous drainage or bleeding from the incision site. A small amount is expected.  4. Any other worrisome condition.    FOLLOW-UP CARE:  If you have not scheduled a follow-up appointment, please call my  at [phone] or email her at   [email or web address] or through our patient portal.  To improve your overall experience routine care dressing or cast changes may be scheduled with an orthopedic assistant.      Follow up appointment_______________________________________________________________________    Additional information: IF taking pain medications  You may have been prescribed a multi-modal pain medication regimen.  Inform your physician of any drug allergies.  Longer-Acting Narcotic (example MS contin)                 Directions:  take 1 tablet by mouth every 12 hours only if needed for pain.  Long-Acting Anti-Inflammatory (example ibuprofen, motrin)                 Directions:  take 1 tablet by mouth every 8 hours for the next five days and then only if needed for pain.  Short-Acting Narcotic (example oxycodone immediate release, dilaudid)                 Directions:  take prescribed amount by mouth every 3 hours as needed for breakthrough pain.    What is breakthrough  pain?  Breakthrough pain is pain that is NOT controlled by the longer-acting pain pill.    Directions for Use:  1. Start taking the longer-acting pain medication at bedtime on the day of your surgery; regardless of if you   have pain.  Since this medication is taken every 12 hours, you don t want to wait until the middle of the night to start it, or you will continue to have to take it in the middle of the night to stay on schedule.      2. DO NOT CRUSH, CHEW OR DISSOLVE THE TABLET.    3. If you have uncontrolled pain (breakthrough) during the 12 hours, take the short acting pain pill as prescribed.    4. Some patients need to take both the longer acting and short acting pain pills for the first few days to control their pain.    5. ALWAYS take with food (soup, jello, fruit or nuts).    6. Pain pills can cause constipation.  Start taking the Miralax one packet or capful daily the morning after surgery.  Continue until you stop using narcotics.    7. If the pain pill causes nausea, take the medication for nausea (if prescribed).  Wait for one hour after taking the anti-nausea medication before you resume your pain pills.        8. If you have itching, take over-the-counter diphenhydramine (Benadryl) as directed on the label.  If you have a rash or hives, do not resume your pain medication until an allergic reaction is ruled out.  Call physician for further instructions.    9. STOP TAKING THE MEDICATIONS AND CALL YOUR DOCTOR IF:       ?  You have uncontrolled nausea and or vomiting     ?  You have a rash or hives     ?   IF YOU HAVE TROUBLE BREATHING OR SWALLOWING, CALL 911       Caring For Your Cast     This information was prepared for you to help you take care of your cast. Please read each section carefully and ask your physician if you have questions.    HOW TO CARE FOR YOUR CAST    If your cast is made of plaster of Consuelo and it becomes soiled, clean using a damp cloth with dry cleanser or use white shoe polish  sparingly. If your cast is made of fiberglass, clean with a damp cloth with a small amount of mild soap.    Avoid bumping or knocking the cast against any hard object. Cover rough areas with tape.    Never trim or cut down the length of your cast. Please call your physician if the cast becomes loose, broken or cracked.    If skin under the cast begins to itch, do not use anything to scratch under the cast since it may break the skin and cause an infection. Parents should be alert to prevent children from sticking forks, sticks or other objects inside the cast.    If your physician orders a cast boot, be sure to wear it.    You may wash areas around the cast, but do not saturate the cast in the process. Some coverings can be used to protect your cast. Please ask you physician to recommend one.  WHAT TO CHECK FOR    Check your fingers or toes for color changes, swelling, loss of motion, numbness, tingling or severe pain. In infants or young children, check for crying that cannot be soothed by usual methods. Call your physician if these symptoms occur.    If swelling is noted during the first 24 to 48 hours, elevate the extremity higher than your head. After this time, elevate it whenever you are in bed.    Check cast for undesirable odors or drainage. Also check for any areas of local pain under your cast. These may be signs of an area of pressure under the cast.    Be sure any padding used is well anchored to the cast. Be careful not to pull padding out. Without the padding. Loose material may slip into cast and cause irritation.    WHEN TO CALL YOUR PHYSICIAN  You should call IF you notice:    A bluish color, increased swelling, loss of motion, increased numbness/tingling or severe pain of fingers or toes.    Unusually foul odor from cast.    Unusual drainage (blood is expected if there has been surgery).    Broken, cracked or very loose cast.    Localized pain under cast.  CAST REMOVAL AND POST CAST CARE    A  specially designed saw will be used to remove your cast. Cast removal is fast and painless.    Use of skin lotion or bathing with bath oil may eliminate some of the dry, flaky skin which is present after your cast is removed. Do not scrub your skin since this may cause skin breakdown.     Elevate your extremity if you notice any swelling after your cast is removed.    Your arm or leg may appear thinner and lighter because you haven t been using it. Your physician will determine how much physical activity is advisable following removal of your cast.    REMEMBER: Cast care must continue as long as your cast is on.

## 2018-02-15 NOTE — IP AVS SNAPSHOT
[facility] PreOP/PostOP    [address]    [facility] [address]    Phone:  [phone]    Fax:  [phone]                                       After Visit Summary   2/15/2018    Celena Padron    MRN: [id number]           After Visit Summary Signature Page     I have received my discharge instructions, and my questions have been answered. I have discussed any challenges I see with this plan with the nurse or doctor.    ..........................................................................................................................................  Patient/Patient Representative Signature      ..........................................................................................................................................  Patient Representative Print Name and Relationship to Patient    ..................................................               ................................................  Date                                            Time    ..........................................................................................................................................  Reviewed by Signature/Title    ...................................................              ..............................................  Date                                                            Time

## 2018-02-15 NOTE — ANESTHESIA PREPROCEDURE EVALUATION
Anesthesia Evaluation     . Pt has had prior anesthetic. Type: General    No history of anesthetic complications          ROS/MED HX    ENT/Pulmonary:      (-) sleep apnea and Other pulmonary disease   Neurologic:      (-) seizures, Other neuro hx and Dementia   Cardiovascular:     (+) Dyslipidemia, ----. : . . . :. .      (-) hypertension, CAD, CHF, arrhythmias and pulmonary hypertension   METS/Exercise Tolerance:     Hematologic:        (-) anemia   Musculoskeletal:   (+) arthritis, , , -       GI/Hepatic:        (-) GERD, hepatitis and other GI/Hepatic   Renal/Genitourinary:      (-) renal disease   Endo:      (-) Type I DM, Type II DM, thyroid disease, chronic steroid usage, other endocrine disorder and obesity   Psychiatric:        (-) psychiatric history   Infectious Disease:  - neg infectious disease ROS       Malignancy:      - no malignancy   Other:    - neg other ROS                 Physical Exam      Airway   Mallampati: II  TM distance: >3 FB  Neck ROM: full    Dental     Cardiovascular   Rhythm and rate: regular and normal  (-) no murmur    Pulmonary    breath sounds clear to auscultation    Other findings: No lab results found.   Lab Test        04/17/12                       0741          GLC          85                          Anesthesia Plan      History & Physical Review  History and physical reviewed and following examination; no interval change.    ASA Status:  1 .    NPO Status:  > 8 hours    Plan for General, ETT and Periph. Nerve Block for postop pain with Propofol induction. Maintenance will be Balanced.    PONV prophylaxis:  Ondansetron (or other 5HT-3) and Dexamethasone or Solumedrol       Postoperative Care  Postoperative pain management:  IV analgesics, Oral pain medications and Peripheral nerve block (Single Shot).      Consents  Anesthetic plan, risks, benefits and alternatives discussed with:  Patient.  Use of blood products discussed: Yes.   Use of blood products discussed with  Patient.  Consented to blood products.  .                          .

## 2018-02-15 NOTE — ANESTHESIA CARE TRANSFER NOTE
Patient: Celena Padron    Procedure(s):  Right ankle and hindfoot ligament reconstruction, peroneal tendon repair and transfer, bunionette correction  Surgeon request general with popliteal and adductor canal anesthesia - Wound Class: I-Clean   - Wound Class: I-Clean    Diagnosis: Sprain of tarsal ligament right foot and bunion of right foot  Diagnosis Additional Information: No value filed.    Anesthesia Type:   General, ETT, Periph. Nerve Block for postop pain     Note:  Airway :Face Mask  Patient transferred to:PACU  Handoff Report: Identifed the Patient, Identified the Reponsible Provider, Reviewed the pertinent medical history, Discussed the surgical course, Reviewed Intra-OP anesthesia mangement and issues during anesthesia, Set expectations for post-procedure period and Allowed opportunity for questions and acknowledgement of understanding      Vitals: (Last set prior to Anesthesia Care Transfer)    CRNA VITALS  2/15/2018 0906 - 2/15/2018 0940      2/15/2018             Pulse: 103    SpO2: 97 %    Resp Rate (observed): 11                Electronically Signed By: PHUC Madrigal CRNA  February 15, 2018  9:40 AM

## 2018-02-15 NOTE — OP NOTE
Procedure Date: 02/15/2018      PREOPERATIVE DIAGNOSIS:  Right bunionette, right hindfoot and ankle instability.      POSTOPERATIVE DIAGNOSIS:  Right bunionette, right hindfoot and ankle instability as well as peroneal brevis irreparable tear.      PROCEDURE:   1.  Right ankle lateral ligament reconstruction.     2.  Right hindfoot ligament reconstruction.   3.  Right peroneal brevis tendon transfer proximally.   4.  Right peroneal brevis tendon repair distally.   5.  Right bunionette correction through a long oblique osteotomy.   6.  X-rays right foot, 3 views, AP, lateral and oblique.      SURGEON:  Nain Mir MD      ANESTHESIA:  General with popliteal.      COMPLICATIONS:  None.      BLOOD LOSS:  Minimal.      TOURNIQUET TIME:  90 minutes.      INDICATIONS:  Ms. Padron had chronic troubles from right forefoot and ankle.  I discussed with her the risks, benefits, advantages, disadvantages, complications and alternatives of the above-mentioned procedures and she agreed to proceed.      PROCEDURE IN DETAIL:  She was appropriately prepped, draped and positioned on the operating table and after satisfactory induction of anesthesia and administration of IV antibiotics, observation of universal timeout protocols.  I made an incision on the lateral aspect of the fifth metatarsal, taken sharply through the skin and subcutaneous tissues, care taken to identify and protect sensory nerves, hemostasis obtained with electrocautery.  I exposed the fifth metatarsal, carefully dissected and aligned for my osteotomy, performed my osteotomy.  I used a K-wire to prevent translation and rotated the fifth metatarsal appropriately.  I then used a point-to-point reduction forceps to obtain compression and placed two 2.3 screws lagged by technique.  I excised the overhanging bone, irrigated the wound, repaired the bunionette deformity with a standard capsuloligamentous repair on the fifth MTP joint.  I then irrigated the wound  and closed it in layers.        I turned my attention lateral ankle.  Made an incision over the tip of the fibula extending it sharply through the skin and subcutaneous tissues, care taken to identify and protect sensory nerves, hemostasis obtained with electrocautery.  As I was dissecting, identified the ganglion cyst fluid.  I started to evaluate the peroneals, the peroneus brevis tendon and it had multifocal complex tearing with significant thickening of its distal edge as it ran into the peroneal groove.  As a fully examined, I found that this tendon tear was irreparable.  Therefore, I extended my incision distally, released the peroneus longus and brevis from the distal sheaths, did a repair of the peroneus brevis to longus distally.  Then I turned my attention proximally and extended my incision proximally, went over the superior peroneal retinaculum and did a side-to-side standard repair to peroneus brevis to the longus there.  I then excised the intervening irreparable segment of brevis.  I protected this peroneus longus tendon, took the anterior talofibular ligament and calcaneofibular ligament off the tip of the fibula, subperiosteal elevated and rongeured to bleeding subcortical bone and advanced the ligaments proximally while maintaining a biomechanical orientation and oversewed the inferior extensor retinaculum to the superior subperiosteal flap in the Hussein modification of this reconstruction.        I also extended my incision over the calcaneocuboid joint, exposed the dorsolateral calcaneocuboid ligament, protecting the extensor digitorum and hallucis brevis muscle bellies.  I found the ligament quite thickened.  I subperiosteally elevated the ligament and advanced it proximally using an E-[employer] 2.3 TT suture anchor.  Once these were all sutured and tied the wound was then copiously irrigated, closed in layers, sterilely dressed.        X-rays of the right foot, AP, lateral and oblique were obtained  fluoroscopic, noted to be good.  The foot was sterilely dressed and transferred she was transferred to recovery room in a short-leg cast.  All lap, sponge and needle counts were correct at the end of the case.      PLAN:  She will bear weight on her heel as tolerated.  Follow up in the clinic in two weeks for cast off, stitches out, placement into a removable fracture boot and will continue heel weightbearing.  She will need x-rays of the right foot, 3 views at 2 weeks and she will need them at 6 weeks when we expect the osteotomies to be healed.  We will also expect her tendon to be healed and she will begin physical therapy.  She will get a prescription at 6 weeks to start and should be weaning out of the boot by 8 weeks using a TriLok style ankle brace.  While she is in the boot, she was asked to get a brace or Ace wrap for swelling and compression management and follow up with us after 2 months of physical therapy when she should be able to start weaning out of the brace for routine activities, but in the brace for athletic activities for 6 months.         ROSEMARY BECKHAM MD             D: 02/15/2018   T: 02/15/2018   MT: RAJ      Name:     MARY COLINDRES   MRN:      [id number]        Account:        [id number]   :      1965           Procedure Date: 02/15/2018      Document: [id number]

## 2018-02-15 NOTE — ANESTHESIA POSTPROCEDURE EVALUATION
Patient: Celena Padron    Procedure(s):  Right ankle and hindfoot ligament reconstruction, peroneal tendon repair and transfer, bunionette correction  Surgeon request general with popliteal and adductor canal anesthesia - Wound Class: I-Clean   - Wound Class: I-Clean    Diagnosis:Sprain of tarsal ligament right foot and bunion of right foot  Diagnosis Additional Information: PREOPERATIVE DIAGNOSIS:  Right bunionette, right hindfoot and ankle instability.       POSTOPERATIVE DIAGNOSIS:  Right bunionette, right hindfoot and ankle instability as well as peroneal brevis irreparable tear.       PROCEDURE:   1.  Right ankle la, teral ligament reconstruction.     2.  Right hindfoot ligament reconstruction.   3.  Right peroneal brevis tendon transfer proximally.   4.  Right peroneal brevis tendon repair distally.   5.  Right bunionette correction through a long oblique osteot, salvador.   6.  X-rays right foot, 3 views, AP, lateral and oblique.         Anesthesia Type:  General, ETT, Periph. Nerve Block for postop pain    Note:  Anesthesia Post Evaluation    Patient location during evaluation: Phase 2  Patient participation: Able to fully participate in evaluation  Level of consciousness: awake  Pain management: adequate  Airway patency: patent  Cardiovascular status: acceptable  Respiratory status: acceptable  Hydration status: euvolemic  PONV: controlled     Anesthetic complications: None          Last vitals:  Vitals:    02/15/18 1055 02/15/18 1104 02/15/18 1213   BP:  107/70 115/42   Resp: 10 12 14   Temp:  98.1  F (36.7  C) 98.1  F (36.7  C)   SpO2: 98% 97% 95%         Electronically Signed By: Dk Roberts MD  February 15, 2018  12:50 PM

## 2018-02-16 LAB — INTERPRETATION ECG - MUSE: NORMAL

## 2018-03-07 ENCOUNTER — HOSPITAL ENCOUNTER (OUTPATIENT)
Dept: ULTRASOUND IMAGING | Facility: CLINIC | Age: 53
Discharge: HOME | End: 2018-03-07
Attending: ORTHOPAEDIC SURGERY | Admitting: ORTHOPAEDIC SURGERY
Payer: COMMERCIAL

## 2018-03-07 DIAGNOSIS — M79.604 RIGHT LEG PAIN: ICD-10-CM

## 2018-03-07 PROCEDURE — 93971 EXTREMITY STUDY: CPT | Mod: RT

## (undated) DEVICE — Device

## (undated) DEVICE — BLADE SAW SAGITTAL 25.5X9.5X.4MM FINE LINVATEC 5023-138

## (undated) DEVICE — ESU GROUND PAD ADULT W/CORD E7507

## (undated) DEVICE — GLOVE PROTEXIS BLUE W/NEU-THERA 8.0  2D73EB80

## (undated) DEVICE — SU VICRYL 3-0 PS-2 27" UND J427H

## (undated) DEVICE — BAG CLEAR TRASH 1.3M 39X33" P4040C

## (undated) DEVICE — LINEN HALF SHEET 5512

## (undated) DEVICE — CAST PADDING 6" UNSTERILE 9046

## (undated) DEVICE — SU VICRYL 1 CT-1 27" J341H

## (undated) DEVICE — GLOVE PROTEXIS POWDER FREE 7.5 ORTHOPEDIC 2D73ET75

## (undated) DEVICE — BNDG KLING 3" 2232

## (undated) DEVICE — TOURNIQUET CUFF 30" REPRO BLUE 60-7070-105

## (undated) DEVICE — GLOVE PROTEXIS POWDER FREE SMT 6.5  2D72PT65X

## (undated) DEVICE — NDL 18GA 1.5" 305196

## (undated) DEVICE — DRSG XEROFORM 1X8"

## (undated) DEVICE — DRSG STERI STRIP 1/2X4" R1547

## (undated) DEVICE — SU PROLENE 3-0 PS-1 18" 8663G

## (undated) DEVICE — SUCTION CANISTER MEDIVAC LINER 3000ML W/LID 65651-530

## (undated) DEVICE — PACK LOWER EXTREMITY RIDGES

## (undated) DEVICE — CAST PADDING 4" STERILE 9044S

## (undated) DEVICE — SUTURE MONOCRYL+ 3-0 PS-1 27" UNDYED MCP936H

## (undated) DEVICE — SUTURE MONOCRYL+ 3-0 SH 27" UNDYED MCP416H

## (undated) DEVICE — LINEN BASIC PACK 5468

## (undated) DEVICE — PREP CHLORAPREP 26ML TINTED ORANGE  260815

## (undated) DEVICE — DRILL TWIST STRK 1.9 X27MM AO END 60-19326

## (undated) DEVICE — DRSG GAUZE 4X4" TRAY

## (undated) DEVICE — CAST FIBERGLASS 4" ROLL WHITE 73458-00003-00

## (undated) DEVICE — CAST BUCKET

## (undated) DEVICE — DRSG VASELINE 3X18" 8884414600

## (undated) DEVICE — LINEN ORTHO PACK 5446

## (undated) DEVICE — PAD CHUX UNDERPAD 23X24" 7136

## (undated) DEVICE — BNDG ELASTIC 3"X5YDS UNSTERILE 6611-30

## (undated) DEVICE — SU VICRYL 0 CT-2 27" J334H

## (undated) DEVICE — LINEN FULL SHEET 5511

## (undated) RX ORDER — GLYCOPYRROLATE 0.2 MG/ML
INJECTION INTRAMUSCULAR; INTRAVENOUS
Status: DISPENSED
Start: 2018-02-15

## (undated) RX ORDER — FENTANYL CITRATE 50 UG/ML
INJECTION, SOLUTION INTRAMUSCULAR; INTRAVENOUS
Status: DISPENSED
Start: 2018-02-15

## (undated) RX ORDER — ONDANSETRON 2 MG/ML
INJECTION INTRAMUSCULAR; INTRAVENOUS
Status: DISPENSED
Start: 2018-02-15

## (undated) RX ORDER — PROPOFOL 10 MG/ML
INJECTION, EMULSION INTRAVENOUS
Status: DISPENSED
Start: 2018-02-15

## (undated) RX ORDER — BUPIVACAINE HYDROCHLORIDE AND EPINEPHRINE 5; 5 MG/ML; UG/ML
INJECTION, SOLUTION EPIDURAL; INTRACAUDAL; PERINEURAL
Status: DISPENSED
Start: 2018-02-15

## (undated) RX ORDER — LIDOCAINE HYDROCHLORIDE 10 MG/ML
INJECTION, SOLUTION EPIDURAL; INFILTRATION; INTRACAUDAL; PERINEURAL
Status: DISPENSED
Start: 2018-02-15

## (undated) RX ORDER — DEXAMETHASONE SODIUM PHOSPHATE 4 MG/ML
INJECTION, SOLUTION INTRA-ARTICULAR; INTRALESIONAL; INTRAMUSCULAR; INTRAVENOUS; SOFT TISSUE
Status: DISPENSED
Start: 2018-02-15

## (undated) RX ORDER — PROMETHAZINE HYDROCHLORIDE 25 MG/ML
INJECTION, SOLUTION INTRAMUSCULAR; INTRAVENOUS
Status: DISPENSED
Start: 2018-02-15

## (undated) RX ORDER — CEFAZOLIN SODIUM 2 G/100ML
INJECTION, SOLUTION INTRAVENOUS
Status: DISPENSED
Start: 2018-02-15

## (undated) RX ORDER — EPHEDRINE SULFATE 50 MG/ML
INJECTION, SOLUTION INTRAVENOUS
Status: DISPENSED
Start: 2018-02-15

## (undated) RX ORDER — OXYCODONE HYDROCHLORIDE 5 MG/1
TABLET ORAL
Status: DISPENSED
Start: 2018-02-15